# Patient Record
Sex: FEMALE | Employment: FULL TIME | URBAN - METROPOLITAN AREA
[De-identification: names, ages, dates, MRNs, and addresses within clinical notes are randomized per-mention and may not be internally consistent; named-entity substitution may affect disease eponyms.]

---
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
## 2018-11-21 DIAGNOSIS — Z01.419 ENCOUNTER FOR GYNECOLOGICAL EXAMINATION WITHOUT ABNORMAL FINDING: ICD-10-CM

## 2018-11-21 DIAGNOSIS — Z30.41 ORAL CONTRACEPTIVE USE: ICD-10-CM

## 2018-11-21 RX ORDER — NORGESTIMATE AND ETHINYL ESTRADIOL
KIT
Qty: 28 TABLET | Refills: 0 | Status: SHIPPED | OUTPATIENT
Start: 2018-11-21 | End: 2018-11-30

## 2018-11-21 NOTE — TELEPHONE ENCOUNTER
"Requested Prescriptions   Pending Prescriptions Disp Refills     MONONESSA 0.25-35 MG-MCG per tablet [Pharmacy Med Name: MONONESSA TABLETS 28S] 84 tablet 0     Sig: TAKE ONE TABLET BY MOUTH EVERY DAY    Contraceptives Protocol Failed    11/21/2018  1:09 PM       Failed - Recent (12 mo) or future (30 days) visit within the authorizing provider's specialty    Patient had office visit in the last 12 months or has a visit in the next 30 days with authorizing provider or within the authorizing provider's specialty.  See \"Patient Info\" tab in inbasket, or \"Choose Columns\" in Meds & Orders section of the refill encounter.             Passed - Patient is not a current smoker if age is 35 or older       Passed - No active pregnancy on record       Passed - No positive pregnancy test in past 12 months      Last Written Prescription Date:  12/4/17  Last Fill Quantity: 84,  # refills: 3   Last office visit: No previous visit found with prescribing provider:  6/20/17 Dr. Mcqueen   Future Office Visit:  None  Medication is being filled for 1 time refill only due to:  Patient needs to be seen because it has been more than one year since last visit.  "

## 2018-11-30 ENCOUNTER — OFFICE VISIT (OUTPATIENT)
Dept: OBGYN | Facility: CLINIC | Age: 18
End: 2018-11-30
Payer: COMMERCIAL

## 2018-11-30 VITALS
SYSTOLIC BLOOD PRESSURE: 122 MMHG | WEIGHT: 151 LBS | DIASTOLIC BLOOD PRESSURE: 80 MMHG | HEIGHT: 68 IN | HEART RATE: 68 BPM | BODY MASS INDEX: 22.88 KG/M2

## 2018-11-30 DIAGNOSIS — Z30.41 ORAL CONTRACEPTIVE USE: ICD-10-CM

## 2018-11-30 DIAGNOSIS — Z11.3 SCREEN FOR STD (SEXUALLY TRANSMITTED DISEASE): ICD-10-CM

## 2018-11-30 DIAGNOSIS — Z11.8 SCREENING FOR CHLAMYDIAL DISEASE: ICD-10-CM

## 2018-11-30 DIAGNOSIS — Z01.419 ENCOUNTER FOR GYNECOLOGICAL EXAMINATION WITHOUT ABNORMAL FINDING: Primary | ICD-10-CM

## 2018-11-30 DIAGNOSIS — Z01.419 ENCOUNTER FOR GYNECOLOGICAL EXAMINATION WITHOUT ABNORMAL FINDING: ICD-10-CM

## 2018-11-30 PROCEDURE — 86696 HERPES SIMPLEX TYPE 2 TEST: CPT | Performed by: NURSE PRACTITIONER

## 2018-11-30 PROCEDURE — 86780 TREPONEMA PALLIDUM: CPT | Performed by: NURSE PRACTITIONER

## 2018-11-30 PROCEDURE — 36415 COLL VENOUS BLD VENIPUNCTURE: CPT | Performed by: NURSE PRACTITIONER

## 2018-11-30 PROCEDURE — 87389 HIV-1 AG W/HIV-1&-2 AB AG IA: CPT | Performed by: NURSE PRACTITIONER

## 2018-11-30 PROCEDURE — 99395 PREV VISIT EST AGE 18-39: CPT | Performed by: NURSE PRACTITIONER

## 2018-11-30 PROCEDURE — 87491 CHLMYD TRACH DNA AMP PROBE: CPT | Performed by: NURSE PRACTITIONER

## 2018-11-30 PROCEDURE — 86695 HERPES SIMPLEX TYPE 1 TEST: CPT | Performed by: NURSE PRACTITIONER

## 2018-11-30 PROCEDURE — 87591 N.GONORRHOEAE DNA AMP PROB: CPT | Performed by: NURSE PRACTITIONER

## 2018-11-30 RX ORDER — NORGESTIMATE AND ETHINYL ESTRADIOL 0.25-0.035
1 KIT ORAL DAILY
Qty: 84 TABLET | Refills: 4 | Status: SHIPPED | OUTPATIENT
Start: 2018-11-30 | End: 2019-12-06

## 2018-11-30 ASSESSMENT — ANXIETY QUESTIONNAIRES
IF YOU CHECKED OFF ANY PROBLEMS ON THIS QUESTIONNAIRE, HOW DIFFICULT HAVE THESE PROBLEMS MADE IT FOR YOU TO DO YOUR WORK, TAKE CARE OF THINGS AT HOME, OR GET ALONG WITH OTHER PEOPLE: NOT DIFFICULT AT ALL
GAD7 TOTAL SCORE: 2
5. BEING SO RESTLESS THAT IT IS HARD TO SIT STILL: NOT AT ALL
3. WORRYING TOO MUCH ABOUT DIFFERENT THINGS: NOT AT ALL
7. FEELING AFRAID AS IF SOMETHING AWFUL MIGHT HAPPEN: NOT AT ALL
1. FEELING NERVOUS, ANXIOUS, OR ON EDGE: NOT AT ALL
6. BECOMING EASILY ANNOYED OR IRRITABLE: SEVERAL DAYS
2. NOT BEING ABLE TO STOP OR CONTROL WORRYING: NOT AT ALL

## 2018-11-30 ASSESSMENT — PATIENT HEALTH QUESTIONNAIRE - PHQ9
SUM OF ALL RESPONSES TO PHQ QUESTIONS 1-9: 2
5. POOR APPETITE OR OVEREATING: SEVERAL DAYS

## 2018-11-30 NOTE — PROGRESS NOTES
Klaus is a 18 year old  female who presents for annual exam.     Besides routine health maintenance, she has no other health concerns today .    HPI:  The patient does not have a PCP.  Pt here today for her annual exam. She is doing well on Sprintec. No BTB. Menses are regular with minimal cramping.     She is s.a. And accepts full STD testing today.    Depression and anxiety has been okay. Manageable. She stopped using her sertaline 6-7 months ago and is doing well.       GYNECOLOGIC HISTORY:    Patient's last menstrual period was 2018.  Her current contraception method is: oral contraceptives and most times condoms.  She  reports that she has never smoked. She has never used smokeless tobacco.    Patient is sexually active.  STD testing offered?  Accepted  Last PHQ-9 score on record =   PHQ-9 SCORE 2018   PHQ-9 Total Score 2     Last GAD7 score on record =   OK-7 SCORE 2018   Total Score 2     Alcohol Score = 4    HEALTH MAINTENANCE:  Cholesterol:   Cholesterol   Date Value Ref Range Status   2015 220 (H) <170 mg/dL Final     Comment:     LDL Cholesterol is the primary guide to therapy.   The NCEP recommends further evaluation of: patients with cholesterol greater   than 200 mg/dL if additional risk factors are present, cholesterol greater   than   240 mg/dL, triglycerides greater than 150 mg/dL, or HDL less than 40 mg/dL.     2015 175 (H) <170 mg/dL Final     Comment:     LDL Cholesterol is the primary guide to therapy.   The NCEP recommends further evaluation of: patients with cholesterol greater   than 200 mg/dL if additional risk factors are present, cholesterol greater   than   240 mg/dL, triglycerides greater than 150 mg/dL, or HDL less than 40 mg/dL.       Last Mammo: never, Result: not applicable, Next Mammo: age 38  Pap: never  Colonoscopy:  never, Result: not applicable, Next Colonoscopy: age 48.  Dexa:  never    Health maintenance updated:   "yes    HISTORY:  Obstetric History       T0      L0     SAB0   TAB0   Ectopic0   Multiple0   Live Births0           Patient Active Problem List   Diagnosis     Other jaw asymmetry     Acne     Persistent cough     Mild intermittent asthma without complication     Uses oral contraception     Mild major depression, single episode (H)     Past Surgical History:   Procedure Laterality Date     ENT SURGERY      T & A      OSTEOTOMY SAGITTAL SPLIT  2013    Procedure: OSTEOTOMY SAGITTAL SPLIT;  SAGITTAL SPLIT OSTEOTOMY;  Surgeon: Casey Roach DDS;  Location: [facility] OR     wisdom teeth removal[        Social History   Substance Use Topics     Smoking status: Never Smoker     Smokeless tobacco: Never Used     Alcohol use No      Problem (# of Occurrences) Relation (Name,Age of Onset)    Depression (1) Other: Family History    Hyperlipidemia (2) Maternal Grandmother, Maternal Grandfather    Hypertension (2) Father, Maternal Grandmother    Thyroid Disease (1) Other: Aunt (unk)       Negative family history of: Coronary Artery Disease, Cerebrovascular Disease, Diabetes, Breast Cancer, Colon Cancer, Prostate Cancer, Unknown/Adopted, Anesthesia Reaction, Anxiety Disorder, Other Cancer            Current Outpatient Prescriptions   Medication Sig     norgestimate-ethinyl estradiol (MONONESSA) 0.25-35 MG-MCG tablet Take 1 tablet by mouth daily     [DISCONTINUED] MONONESSA 0.25-35 MG-MCG per tablet TAKE ONE TABLET BY MOUTH EVERY DAY     No current facility-administered medications for this visit.      Allergies   Allergen Reactions     Sulfa Drugs Rash       Past medical, surgical, social and family histories were reviewed and updated in EPIC.    ROS:   12 point review of systems negative other than symptoms noted below.    EXAM:  /80  Pulse 68  Ht 5' 8\" (1.727 m)  Wt 151 lb (68.5 kg)  LMP 2018  BMI 22.96 kg/m2   BMI: Body mass index is 22.96 kg/(m^2).    PHYSICAL " EXAM:  Constitutional:  Appearance: Well nourished, well developed, alert, in no acute distress  Neck:  Lymph Nodes:  No lymphadenopathy present    Thyroid:  Gland size normal, nontender, no nodules or masses present  on palpation  Chest:  Respiratory Effort:  Breathing unlabored  Cardiovascular:    Heart: Auscultation:  Regular rate, normal rhythm, no murmurs present  Breasts: Deferred.  Gastrointestinal:   Abdominal Examination:  Abdomen nontender to palpation, tone normal without rigidity or guarding, no masses present, umbilicus without lesions   Liver and Spleen:  No hepatomegaly present, liver nontender to palpation    Hernias:  No hernias present  Lymphatic: Lymph Nodes:  No other lymphadenopathy present  Skin:  General Inspection:  No rashes present, no lesions present, no areas of  discoloration    Genitalia and Groin:  No rashes present, no lesions present, no areas of  discoloration, no masses present  Neurologic/Psychiatric:    Mental Status:  Oriented X3     No Pelvic Exam performed    COUNSELING:   Special attention given to:        Regular exercise       Healthy diet/nutrition       Contraception       Safe sex practices/STD prevention    BMI: Body mass index is 22.96 kg/(m^2).      ASSESSMENT:  18 year old female with satisfactory annual exam.    ICD-10-CM    1. Encounter for gynecological examination without abnormal finding Z01.419    2. Encounter for gynecological examination without abnormal finding [Z01.419] Z01.419 norgestimate-ethinyl estradiol (MONONESSA) 0.25-35 MG-MCG tablet   3. Oral contraceptive use Z30.41 norgestimate-ethinyl estradiol (MONONESSA) 0.25-35 MG-MCG tablet   4. Screen for STD (sexually transmitted disease) Z11.3 NEISSERIA GONORRHOEA PCR     Treponema Abs w Reflex to RPR and Titer     Herpes Simplex Virus 1 and 2 IgG     HIV Antigen Antibody Combo   5. Screening for chlamydial disease Z11.8 CHLAMYDIA TRACHOMATIS PCR       PLAN:  Healthy 17 yo female. Start pap and pelvic  exams next year. Ok to continue OCP's x1 year. Will update on STD testing next week.    GIDEON Christianson CNP

## 2018-11-30 NOTE — MR AVS SNAPSHOT
"              After Visit Summary   11/30/2018    Klaus Mancini    MRN: [id number]           Patient Information     Date Of Birth          2000        Visit Information        Provider Department      11/30/2018 10:30 AM Kiana Morfin APRN CNP [facility]        Today's Diagnoses     Encounter for gynecological examination without abnormal finding    -  1    Encounter for gynecological examination without abnormal finding [Z01.419]        Oral contraceptive use        Screen for STD (sexually transmitted disease)        Screening for chlamydial disease           Follow-ups after your visit        Who to contact     If you have questions or need follow up information about today's clinic visit or your schedule please contact [facility] directly at [phone].  Normal or non-critical lab and imaging results will be communicated to you by MyChart, letter or phone within 4 business days after the clinic has received the results. If you do not hear from us within 7 days, please contact the clinic through MyChart or phone. If you have a critical or abnormal lab result, we will notify you by phone as soon as possible.  Submit refill requests through [employer] or call your pharmacy and they will forward the refill request to us. Please allow 3 business days for your refill to be completed.          Additional Information About Your Visit        Care EveryWhere ID     This is your Care EveryWhere ID. This could be used by other organizations to access your [place] medical records  [id number]        Your Vitals Were     Pulse Height Last Period BMI (Body Mass Index)          68 5' 8\" (1.727 m) 11/17/2018 22.96 kg/m2         Blood Pressure from Last 3 Encounters:   11/30/18 122/80   08/08/17 139/84   06/20/17 130/86    Weight from Last 3 Encounters:   11/30/18 151 lb (68.5 kg)   06/20/17 176 lb (79.8 kg) (94 %)*   06/13/16 145 lb (65.8 kg) (81 %)*     * Growth percentiles " are based on [facility] 2-20 Years data.              We Performed the Following     CHLAMYDIA TRACHOMATIS PCR     Herpes Simplex Virus 1 and 2 IgG     HIV Antigen Antibody Combo     NEISSERIA GONORRHOEA PCR     Treponema Abs w Reflex to RPR and Titer          Today's Medication Changes          These changes are accurate as of 11/30/18 10:55 AM.  If you have any questions, ask your nurse or doctor.               These medicines have changed or have updated prescriptions.        Dose/Directions    norgestimate-ethinyl estradiol 0.25-35 MG-MCG tablet   Commonly known as:  MONONESSA   This may have changed:  See the new instructions.   Used for:  Encounter for gynecological examination without abnormal finding, Oral contraceptive use   Changed by:  Kiana Morfin APRN CNP        Dose:  1 tablet   Take 1 tablet by mouth daily   Quantity:  84 tablet   Refills:  4            Where to get your medicines      These medications were sent to [id number] - [place], [place] - [address] AT [facility] [address]  [address], [place] [address]     Phone:  [phone]     norgestimate-ethinyl estradiol 0.25-35 MG-MCG tablet                Primary Care Provider Fax #    Physician No Ref-Primary [phone]       No address on file        Equal Access to Services     AMINTA LAUGHLIN : Don mooreo Sosalma, waaxda luqadaha, qaybta kaalmada adeegyada, daisy nelson. So [facility] [phone].    ATENCIÓN: Si habla español, tiene a lin disposición servicios gratuitos de asistencia lingüística. Llame al [phone].    We comply with applicable federal civil rights laws and [place] laws. We do not discriminate on the basis of race, color, national origin, age, disability, sex, sexual orientation, or gender identity.            Thank you!     Thank you for choosing [facility]  for your care. Our goal is always to provide you with excellent care. Hearing back  from our patients is one way we can continue to improve our services. Please take a few minutes to complete the written survey that you may receive in the mail after your visit with us. Thank you!             Your Updated Medication List - Protect others around you: Learn how to safely use, store and throw away your medicines at www.disposemymeds.org.          This list is accurate as of 11/30/18 10:55 AM.  Always use your most recent med list.                   Brand Name Dispense Instructions for use Diagnosis    norgestimate-ethinyl estradiol 0.25-35 MG-MCG tablet    MONONESSA    84 tablet    Take 1 tablet by mouth daily    Encounter for gynecological examination without abnormal finding, Oral contraceptive use

## 2018-12-01 LAB
HSV1 IGG SERPL QL IA: <0.2 AI (ref 0–0.8)
HSV2 IGG SERPL QL IA: <0.2 AI (ref 0–0.8)
T PALLIDUM AB SER QL: NONREACTIVE

## 2018-12-01 ASSESSMENT — ANXIETY QUESTIONNAIRES: GAD7 TOTAL SCORE: 2

## 2018-12-02 LAB
C TRACH DNA SPEC QL NAA+PROBE: NEGATIVE
N GONORRHOEA DNA SPEC QL NAA+PROBE: NEGATIVE
SPECIMEN SOURCE: NORMAL
SPECIMEN SOURCE: NORMAL

## 2018-12-03 LAB — HIV 1+2 AB+HIV1 P24 AG SERPL QL IA: NONREACTIVE

## 2019-12-05 NOTE — PROGRESS NOTES
Klaus is a 19 year old  female who presents for annual exam.     Besides routine health maintenance, she would like to discuss getting on meds for depression/anxiety.    HPI:  No primary care provider on file.  Pt here today for her annual gyn exam. She is s.a. one male partner.     [facility] Pre law. Good OCP taker. No issues.     Her main concern is anxiety and depression. She was on sertraline over 1year ago and stopped as seh was feeling good. She was on Sertraline 50mg daily.     Her phq and stefano scores were 2/2 last year.       GYNECOLOGIC HISTORY:    Patient's last menstrual period was 2019 (approximate).    Regular menses? yes  Menses every 28 days.  Length of menses: 6 days    Her current contraception method is: oral contraceptives.  She  reports that she has never smoked. She has never used smokeless tobacco.    Patient is sexually active.  STD testing offered?  Declined  Last PHQ-9 score on record =   PHQ-9 SCORE 2019   PHQ-9 Total Score 8     Last GAD7 score on record =   STEFANO-7 SCORE 2019   Total Score 9     Alcohol Score = 4    HEALTH MAINTENANCE:  Cholesterol: (  Cholesterol   Date Value Ref Range Status   2015 220 (H) <170 mg/dL Final     Comment:     LDL Cholesterol is the primary guide to therapy.   The NCEP recommends further evaluation of: patients with cholesterol greater   than 200 mg/dL if additional risk factors are present, cholesterol greater   than   240 mg/dL, triglycerides greater than 150 mg/dL, or HDL less than 40 mg/dL.     2015 175 (H) <170 mg/dL Final     Comment:     LDL Cholesterol is the primary guide to therapy.   The NCEP recommends further evaluation of: patients with cholesterol greater   than 200 mg/dL if additional risk factors are present, cholesterol greater   than   240 mg/dL, triglycerides greater than 150 mg/dL, or HDL less than 40 mg/dL.       Last Mammo: NA, due at age 38  Pap: NA  Colonoscopy: NA, due at age 48  Dexa:  Never    Health maintenance updated:  yes    HISTORY:  OB History    Para Term  AB Living   0 0 0 0 0 0   SAB TAB Ectopic Multiple Live Births   0 0 0 0 0       Patient Active Problem List   Diagnosis     Other jaw asymmetry     Acne     Persistent cough     Mild intermittent asthma without complication     Uses oral contraception     Mild major depression, single episode (H)     Past Surgical History:   Procedure Laterality Date     ENT SURGERY      T & A      OSTEOTOMY SAGITTAL SPLIT  2013    Procedure: OSTEOTOMY SAGITTAL SPLIT;  SAGITTAL SPLIT OSTEOTOMY;  Surgeon: Casey Roach DDS;  Location: [facility] OR     wisdom teeth removal[        Social History     Tobacco Use     Smoking status: Never Smoker     Smokeless tobacco: Never Used   Substance Use Topics     Alcohol use: Yes     Alcohol/week: 0.0 standard drinks      Problem (# of Occurrences) Relation (Name,Age of Onset)    Depression (1) Other: Family History    Hyperlipidemia (2) Maternal Grandmother, Maternal Grandfather    Hypertension (2) Father, Maternal Grandmother    Thyroid Disease (1) Other: Aunt (unk)       Negative family history of: Coronary Artery Disease, Cerebrovascular Disease, Diabetes, Breast Cancer, Colon Cancer, Prostate Cancer, Unknown/Adopted, Anesthesia Reaction, Anxiety Disorder, Other Cancer            Current Outpatient Medications   Medication Sig     norgestimate-ethinyl estradiol (MONONESSA) 0.25-35 MG-MCG tablet Take 1 tablet by mouth daily     sertraline (ZOLOFT) 25 MG tablet Take 1 tablet (25 mg) by mouth daily     No current facility-administered medications for this visit.      Allergies   Allergen Reactions     Sulfa Drugs Rash       Past medical, surgical, social and family histories were reviewed and updated in EPIC.    ROS:   12 point review of systems negative other than symptoms noted below or in the HPI.  Gastrointestinal: Abdominal Pain, Bloating and Nausea  Psychiatric: Anxiety and  "Depression  No urinary frequency or dysuria, bladder or kidney problems    EXAM:  /80   Pulse 72   Ht 1.727 m (5' 8\")   Wt 70.5 kg (155 lb 6.4 oz)   LMP 11/06/2019 (Approximate)   BMI 23.63 kg/m     BMI: Body mass index is 23.63 kg/m .    PHYSICAL EXAM:  Constitutional:   Appearance: Well nourished, well developed, alert, in no acute distress  Neck:  Lymph Nodes:  No lymphadenopathy present    Thyroid:  Gland size normal, nontender, no nodules or masses present  on palpation  Chest:  Respiratory Effort:  Breathing unlabored  Cardiovascular:    Heart: Auscultation:  Regular rate, normal rhythm, no murmurs present  Breasts: Inspection of Breasts:  No lymphadenopathy present., Palpation of Breasts and Axillae:  No masses present on palpation, no breast tenderness., Axillary Lymph Nodes:  No lymphadenopathy present. and No nodularity, asymmetry or nipple discharge bilaterally.  Gastrointestinal:   Abdominal Examination:  Abdomen nontender to palpation, tone normal without rigidity or guarding, no masses present, umbilicus without lesions   Liver and Spleen:  No hepatomegaly present, liver nontender to palpation    Hernias:  No hernias present  Lymphatic: Lymph Nodes:  No other lymphadenopathy present  Skin:  General Inspection:  No rashes present, no lesions present, no areas of  discoloration  Neurologic:    Mental Status:  Oriented X3.  Normal strength and tone, sensory exam                grossly normal, mentation intact and speech normal.    Psychiatric:   Mentation appears normal and affect normal/bright.         Pelvic Exam:  External Genitalia:     Normal appearance for age, no discharge present, no tenderness present, no inflammatory lesions present, color normal  Vagina:     Normal vaginal vault without central or paravaginal defects, no discharge present, no inflammatory lesions present, no masses present  Bladder:     Nontender to palpation  Urethra:   Urethral Body:  Urethra palpation normal, " urethra structural support normal   Urethral Meatus:  No erythema or lesions present  Cervix:     Appearance healthy, no lesions present, nontender to palpation, no bleeding present  Uterus:     Uterus: firm, normal sized and nontender, midplane in position.   Adnexa:     No adnexal tenderness present, no adnexal masses present  Perineum:     Perineum within normal limits, no evidence of trauma, no rashes or skin lesions present  Anus:     Anus within normal limits, no hemorrhoids present  Inguinal Lymph Nodes:     No lymphadenopathy present  Pubic Hair:     Normal pubic hair distribution for age  Genitalia and Groin:     No rashes present, no lesions present, no areas of discoloration, no masses present      COUNSELING:   Special attention given to:        Regular exercise       Healthy diet/nutrition       Contraception       Safe sex practices/STD prevention    BMI: Body mass index is 23.63 kg/m .      ASSESSMENT:  19 year old female with satisfactory annual exam.    ICD-10-CM    1. Encounter for gynecological examination without abnormal finding [Z01.419] Z01.419 Pap imaged thin layer screen only - recommended age 21 - 24 years   2. Oral contraceptive use Z30.41 norgestimate-ethinyl estradiol (MONONESSA) 0.25-35 MG-MCG tablet   3. Mild major depression, single episode (H) F32.0 sertraline (ZOLOFT) 25 MG tablet       PLAN:  Normal gyn exam. Pap completed today. If NIL pap every 3 years. Continue OCP's x1year. Will start low dose sertraline. Need updated phq and stefano scores in 4-6 weeks via phone. If okay will continue RX x1 year.    GIDEON Christianson CNP

## 2019-12-06 ENCOUNTER — OFFICE VISIT (OUTPATIENT)
Dept: OBGYN | Facility: CLINIC | Age: 19
End: 2019-12-06
Payer: COMMERCIAL

## 2019-12-06 VITALS
HEIGHT: 68 IN | SYSTOLIC BLOOD PRESSURE: 122 MMHG | BODY MASS INDEX: 23.55 KG/M2 | DIASTOLIC BLOOD PRESSURE: 80 MMHG | HEART RATE: 72 BPM | WEIGHT: 155.4 LBS

## 2019-12-06 DIAGNOSIS — Z23 NEED FOR PROPHYLACTIC VACCINATION AND INOCULATION AGAINST INFLUENZA: ICD-10-CM

## 2019-12-06 DIAGNOSIS — Z30.41 ORAL CONTRACEPTIVE USE: ICD-10-CM

## 2019-12-06 DIAGNOSIS — F32.0 MILD MAJOR DEPRESSION, SINGLE EPISODE (H): ICD-10-CM

## 2019-12-06 DIAGNOSIS — Z01.419 ENCOUNTER FOR GYNECOLOGICAL EXAMINATION WITHOUT ABNORMAL FINDING: Primary | ICD-10-CM

## 2019-12-06 PROCEDURE — 90471 IMMUNIZATION ADMIN: CPT | Performed by: NURSE PRACTITIONER

## 2019-12-06 PROCEDURE — 90686 IIV4 VACC NO PRSV 0.5 ML IM: CPT | Performed by: NURSE PRACTITIONER

## 2019-12-06 PROCEDURE — G0145 SCR C/V CYTO,THINLAYER,RESCR: HCPCS | Performed by: NURSE PRACTITIONER

## 2019-12-06 PROCEDURE — 99213 OFFICE O/P EST LOW 20 MIN: CPT | Mod: 25 | Performed by: NURSE PRACTITIONER

## 2019-12-06 PROCEDURE — 99395 PREV VISIT EST AGE 18-39: CPT | Performed by: NURSE PRACTITIONER

## 2019-12-06 RX ORDER — SERTRALINE HYDROCHLORIDE 25 MG/1
25 TABLET, FILM COATED ORAL DAILY
Qty: 90 TABLET | Refills: 0 | Status: SHIPPED | OUTPATIENT
Start: 2019-12-06

## 2019-12-06 RX ORDER — NORGESTIMATE AND ETHINYL ESTRADIOL 0.25-0.035
1 KIT ORAL DAILY
Qty: 84 TABLET | Refills: 4 | Status: SHIPPED | OUTPATIENT
Start: 2019-12-06

## 2019-12-06 ASSESSMENT — PATIENT HEALTH QUESTIONNAIRE - PHQ9
5. POOR APPETITE OR OVEREATING: MORE THAN HALF THE DAYS
SUM OF ALL RESPONSES TO PHQ QUESTIONS 1-9: 8

## 2019-12-06 ASSESSMENT — ANXIETY QUESTIONNAIRES
6. BECOMING EASILY ANNOYED OR IRRITABLE: NOT AT ALL
1. FEELING NERVOUS, ANXIOUS, OR ON EDGE: MORE THAN HALF THE DAYS
IF YOU CHECKED OFF ANY PROBLEMS ON THIS QUESTIONNAIRE, HOW DIFFICULT HAVE THESE PROBLEMS MADE IT FOR YOU TO DO YOUR WORK, TAKE CARE OF THINGS AT HOME, OR GET ALONG WITH OTHER PEOPLE: SOMEWHAT DIFFICULT
2. NOT BEING ABLE TO STOP OR CONTROL WORRYING: MORE THAN HALF THE DAYS
5. BEING SO RESTLESS THAT IT IS HARD TO SIT STILL: SEVERAL DAYS
7. FEELING AFRAID AS IF SOMETHING AWFUL MIGHT HAPPEN: NOT AT ALL
GAD7 TOTAL SCORE: 9
3. WORRYING TOO MUCH ABOUT DIFFERENT THINGS: MORE THAN HALF THE DAYS

## 2019-12-06 ASSESSMENT — MIFFLIN-ST. JEOR: SCORE: 1518.39

## 2019-12-06 NOTE — LETTER
December 12, 2019      Klaus Mancini  [address] [facility] [address]    Dear ,      I am happy to inform you that your recent cervical cancer screening test (PAP smear) was normal.      Preventative screenings such as this help to ensure your health for years to come. You should repeat a pap smear in 3 years, unless otherwise directed.      You will still need to return to the clinic every year for your annual exam and other preventive tests.     If you have additional questions regarding this result, please call our registered nurse, Maricruz at [phone].      Sincerely,      GIDEON Christianson CNP//[facility]

## 2019-12-07 ASSESSMENT — ANXIETY QUESTIONNAIRES: GAD7 TOTAL SCORE: 9

## 2019-12-10 LAB
COPATH REPORT: NORMAL
PAP: NORMAL

## 2020-03-05 DIAGNOSIS — F32.0 MILD MAJOR DEPRESSION, SINGLE EPISODE (H): ICD-10-CM

## 2020-03-05 NOTE — TELEPHONE ENCOUNTER
"Requested Prescriptions   Pending Prescriptions Disp Refills     sertraline (ZOLOFT) 25 MG tablet [Pharmacy Med Name: SERTRALINE 25MG TABLETS] 90 tablet 0     Sig: TAKE 1 TABLET BY MOUTH DAILY       SSRIs Protocol Failed - 3/5/2020 12:07 PM        Failed - PHQ-9 score less than 5 in past 6 months     Please review last PHQ-9 score.           Passed - Medication is active on med list        Passed - Patient is age 18 or older        Passed - No active pregnancy on record        Passed - No positive pregnancy test in last 12 months        Passed - Recent (6 mo) or future (30 days) visit within the authorizing provider's specialty     Patient had office visit in the last 6 months or has a visit in the next 30 days with authorizing provider or within the authorizing provider's specialty.  See \"Patient Info\" tab in inbasket, or \"Choose Columns\" in Meds & Orders section of the refill encounter.            Last Written Prescription Date:  12/6/19  Last Fill Quantity: 90,  # refills: 0   Last office visit: 12/6/2019 with prescribing provider:  JORGE A Morfin NP   PLAN:  Normal gyn exam. Pap completed today. If NIL pap every 3 years. Continue OCP's x1year. Will start low dose sertraline. Need updated phq and ok scores in 4-6 weeks via phone. If okay will continue RX x1 year.   GIDEON Christianson CNP    Called and left detailed vm instructing pt to call and discus with nurse about how Zoloft is working for her AND complete PHQ/OK over the phone. Further refills will be dispensed based on update.    Kiana Merlos RN on 3/5/2020 at 12:16 PM    Rx declined, pt needs to call with update  Damaris Foote RN on 3/13/2020 at 11:19 AM           "

## 2020-03-13 RX ORDER — SERTRALINE HYDROCHLORIDE 25 MG/1
TABLET, FILM COATED ORAL
Qty: 90 TABLET | Refills: 0 | OUTPATIENT
Start: 2020-03-13

## 2023-05-19 NOTE — TELEPHONE ENCOUNTER
Pt calling needing Rx transferred to new pharmacy. Informed pt to have her new pharmacy call her old one to get it transferred. Pt stated understanding and had no further questions.   
DISPLAY PLAN FREE TEXT